# Patient Record
Sex: FEMALE | Race: OTHER | Employment: UNEMPLOYED | URBAN - METROPOLITAN AREA
[De-identification: names, ages, dates, MRNs, and addresses within clinical notes are randomized per-mention and may not be internally consistent; named-entity substitution may affect disease eponyms.]

---

## 2020-07-10 ENCOUNTER — OFFICE VISIT (OUTPATIENT)
Dept: NEUROLOGY | Facility: CLINIC | Age: 60
End: 2020-07-10
Payer: OTHER GOVERNMENT

## 2020-07-10 VITALS
WEIGHT: 199.75 LBS | BODY MASS INDEX: 34.1 KG/M2 | HEIGHT: 64 IN | DIASTOLIC BLOOD PRESSURE: 84 MMHG | SYSTOLIC BLOOD PRESSURE: 118 MMHG | HEART RATE: 80 BPM

## 2020-07-10 DIAGNOSIS — R20.0 ARM NUMBNESS LEFT: Primary | ICD-10-CM

## 2020-07-10 PROCEDURE — 99999 PR PBB SHADOW E&M-NEW PATIENT-LVL III: CPT | Mod: PBBFAC,,, | Performed by: PSYCHIATRY & NEUROLOGY

## 2020-07-10 PROCEDURE — 99203 OFFICE O/P NEW LOW 30 MIN: CPT | Mod: PBBFAC | Performed by: PSYCHIATRY & NEUROLOGY

## 2020-07-10 PROCEDURE — 99203 OFFICE O/P NEW LOW 30 MIN: CPT | Mod: S$PBB,,, | Performed by: PSYCHIATRY & NEUROLOGY

## 2020-07-10 PROCEDURE — 99203 PR OFFICE/OUTPT VISIT, NEW, LEVL III, 30-44 MIN: ICD-10-PCS | Mod: S$PBB,,, | Performed by: PSYCHIATRY & NEUROLOGY

## 2020-07-10 PROCEDURE — 99999 PR PBB SHADOW E&M-NEW PATIENT-LVL III: ICD-10-PCS | Mod: PBBFAC,,, | Performed by: PSYCHIATRY & NEUROLOGY

## 2020-07-10 NOTE — PROGRESS NOTES
Subjective:      Patient ID: Constance Hope is a 59 y.o. female.    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  Apparently diagnostic studies were done at that time which showed that she had a degenerative joint disease with bone spurs.  This was 1st noted in 2009.    The patient states that recently she had noted a marked increase in her neck and left shoulder pain.  The patient was referred to physical therapy and with therapy, the neck pain and shoulder pain has improved but the numbness is still present in the left hand particularly in the 3rd, 4th, and 5th fingers.  The patient states that there is a tingling sensation that is intermittent but is pretty much confined to that area.  In addition to the numbness and tingling as described above, the patient feels that there is decreased  strength in the left hand compared to that on the right.  The patient who is right-handed indicates that she is not aware of any symptoms on the right.    The patient has noted numbness and tingling developing when driving and holding her arms in a static position on the steering wheel.  She has also noted intermittent left elbow pain which does radiate down the arm towards the hand.  The patient notes occasionally has nocturnal awakening due to numbness and tingling in the left hand as well.    With those symptoms, the patient was referred for EMG and nerve conduction study.  However at the time of this visit, EMG technician is not available.          ROS:  GENERAL: NO FEVER, CHILLS, FATIGABILITY OR WEIGHT LOSS.  SKIN: NO RASHES, ITCHING OR CHANGES IN COLOR OR TEXTURE OF SKIN.  HEAD: NO HEADACHES OR RECENT HEAD TRAUMA.  EYES: VISUAL ACUITY FINE. NO PHOTOPHOBIA, OCULAR PAIN OR DIPLOPIA.  EARS: DENIES EAR PAIN, DISCHARGE OR VERTIGO.  NOSE: NO LOSS OF SMELL, NO EPISTAXIS OR  POSTNASAL DRIP.  MOUTH & THROAT: NO HOARSENESS OR CHANGE IN VOICE. NO EXCESSIVE GUM BLEEDING.  NODES: DENIES SWOLLEN GLANDS.  CHEST: DENIES ZAMORANO, CYANOSIS, WHEEZING, COUGH AND SPUTUM PRODUCTION.  CARDIOVASCULAR: DENIES CHEST PAIN, PND, ORTHOPNEA OR REDUCED EXERCISE TOLERANCE.  ABDOMEN: APPETITE FINE. NO WEIGHT LOSS. DENIES DIARRHEA, ABDOMINAL PAIN, HEMATEMESIS OR BLOOD IN STOOL.  URINARY: NO FLANK PAIN, DYSURIA OR HEMATURIA.  PERIPHERAL VASCULAR: NO CLAUDICATION OR CYANOSIS.  MUSCULOSKELETAL:   SEE COMMENTS IN PRESENT ILLNESS  NEUROLOGIC: NO HISTORY OF SEIZURES, PARALYSIS, ALTERATION OF GAIT OR COORDINATION.    History reviewed. No pertinent past medical history.  History reviewed. No pertinent surgical history.  History reviewed. No pertinent family history.  Social History     Socioeconomic History    Marital status:      Spouse name: Not on file    Number of children: Not on file    Years of education: Not on file    Highest education level: Not on file   Occupational History    Not on file   Social Needs    Financial resource strain: Not on file    Food insecurity     Worry: Not on file     Inability: Not on file    Transportation needs     Medical: Not on file     Non-medical: Not on file   Tobacco Use    Smoking status: Current Some Day Smoker    Smokeless tobacco: Never Used   Substance and Sexual Activity    Alcohol use: Yes    Drug use: Never    Sexual activity: Never   Lifestyle    Physical activity     Days per week: Not on file     Minutes per session: Not on file    Stress: Not on file   Relationships    Social connections     Talks on phone: Not on file     Gets together: Not on file     Attends [employer] service: Not on file     Active member of club or organization: Not on file     Attends meetings of clubs or organizations: Not on file     Relationship status: Not on file   Other Topics Concern    Not on file   Social History Narrative    Not on file         Objective:   PE:    VITAL SIGNS:  HEIGHT 5 FT 4 IN, WEIGHT 90.6 KG, BMI 34.28  Vitals:    07/10/20 1358   BP: 118/84   Pulse: 80     APPEARANCE: WELL NOURISHED, WELL DEVELOPED, IN NO ACUTE DISTRESS.    HEAD: NORMOCEPHALIC, ATRAUMATIC.  EYES: PERRL. EOMI.  NON-ICTERIC SCLERAE.    EARS: TM'S INTACT. LIGHT REFLEX NORMAL. NO RETRACTION OR PERFORATION.    NOSE: MUCOSA PINK. AIRWAY CLEAR.  MOUTH & THROAT: NO TONSILLAR ENLARGEMENT. NO PHARYNGEAL ERYTHEMA OR EXUDATE. NO STRIDOR.  NECK: SUPPLE. NO BRUITS.  CHEST: LUNGS CLEAR TO AUSCULTATION.  CARDIOVASCULAR: REGULAR RHYTHM WITHOUT SIGNIFICANT MURMURS.  MUSCULOSKELETAL:  NO BONY DEFORMITY SEEN.   THE PATIENT HAS A POSITIVE TINEL SIGN OVER THE LEFT ULNAR GROOVE AND IN THE CARPAL TUNNEL AS WELL.  THERE WAS NO ATROPHY SEEN OF THE HAND MUSCULATURE OR FOREARM MUSCULATURE.  PHALEN'S TEST IS NEGATIVE.    NEUROLOGIC:   MENTAL STATUS:  THE PATIENT IS WELL ORIENTED TO PERSON, TIME, PLACE, AND SITUATION.  THE PATIENT IS ATTENTIVE TO THE ENVIRONMENT AND COOPERATIVE FOR THE EXAM.    MOTOR:   STRENGTH IS MEASURED AT 50 LB ON THE LEFT AND 45 LB ON THE RIGHT.  MANUAL MUSCLE TESTING OF DELTOID, BICEPS, TRICEPS, WRIST EXTENSION, WRIST FLEXION, PRONATION, SUPINATION AND INTRINSIC HAND MUSCULATURE DEMONSTRATES A VERY SLIGHT DEGREE OF WEAKNESS IN THE LEFT MEASURED 4/5 COMPARED TO 05/05 ON THE RIGHT.  I COULD NOT LOCALIZE THE WEAKNESS TO ANYONE PARTICULAR MUSCLE GROUP HOWEVER.  SENSORY:    THE PATIENT REPORTS INTACT PERCEPTION OF MONOFILAMENT TESTING AND PINPRICK IN ALL 5 FINGERTIPS OF BOTH HANDS AS WELL AS ON THE DORSUM OF THE HAND AND ON THE DORSUM OF THE FOREARM.  I COULD NOT MAP OUT ANY PARTICULAR SENSORY DEFICIT IN EITHER HAND.  CEREBELLAR:  FINGER TO NOSE DONE WELL.  ALTERNATING MOVEMENTS INTACT.  NO INVOLUNTARY MOVEMENTS OR TREMOR SEEN.  REFLEXES:  STRETCH REFLEXES ARE 2+ BOTH   BICEPS, TRICEPS, BRACHIORADIALIS AND KNEES.  PLANTAR STIMULATION IS FLEXOR BILATERALLY AND NO PATHOLOGICAL REFLEXES ARE SEEN               Assessment:     Encounter Diagnosis   Name Primary?    Arm numbness left Yes       Discussion:  The patient's symptoms are somewhat difficult to localize.  She certainly does have prior evidence of cervical spondylosis and is bone spur formation but does have a positive Tinel sign over the ulnar groove.  She does not have any obvious severe motor deficit in either hand.  Her sensory findings do not localized anyone particular dermatomal or peripheral nerve distribution.  Therefore, she will need EMG and nerve conduction study to localize the area of concern.  This will be scheduled for the patient.    Plan:     1.  Schedule EMG nerve conduction study of left upper extremity with comparison studies on the right.    This was a 45 min visit with the patient with over 50% time spent counseling the patient regarding the differential diagnosis of her arm numbness and pain.  This note is generated with speech recognition software and is subject to transcription error and sound alike phrases that may be missed by proofreading.

## 2020-07-10 NOTE — LETTER
July 10, 2020      [facility]  [address] [address]           [facility]  [address] [address] [address]  Phone: [phone]  Fax: [phone]          Patient: Constance Hope   MR Number: [id number]   YOB: 1960   Date of Visit: 7/10/2020       Dear [facility]:    Thank you for referring Constance Hope to me for evaluation. Attached you will find relevant portions of my assessment and plan of care.    If you have questions, please do not hesitate to call me. I look forward to following Constance Hope along with you.    Sincerely,    Zack Bond MD    Enclosure  CC:  No Recipients    If you would like to receive this communication electronically, please contact [email or web address] or [phone] to request more information on [employer] access.    For providers and/or their staff who would like to refer a patient to [facility], please contact us through our one-stop-shop provider referral line, [facility], at [phone].    If you feel you have received this communication in error or would no longer like to receive these types of communications, please e-mail [email or web address]